# Patient Record
Sex: MALE | Race: WHITE | NOT HISPANIC OR LATINO | Employment: OTHER | URBAN - METROPOLITAN AREA
[De-identification: names, ages, dates, MRNs, and addresses within clinical notes are randomized per-mention and may not be internally consistent; named-entity substitution may affect disease eponyms.]

---

## 2020-06-03 ENCOUNTER — NEW PATIENT COMPREHENSIVE (OUTPATIENT)
Dept: URBAN - METROPOLITAN AREA CLINIC 47 | Facility: CLINIC | Age: 75
End: 2020-06-03

## 2020-06-03 DIAGNOSIS — H25.11: ICD-10-CM

## 2020-06-03 DIAGNOSIS — H25.12: ICD-10-CM

## 2020-06-03 DIAGNOSIS — H52.7: ICD-10-CM

## 2020-06-03 PROCEDURE — 92004 COMPRE OPH EXAM NEW PT 1/>: CPT

## 2020-06-03 PROCEDURE — 92015 DETERMINE REFRACTIVE STATE: CPT

## 2020-06-03 ASSESSMENT — VISUAL ACUITY
OD_CC: J2
OS_CC: 20/50-1
OD_BAT: >20/400
OS_CC: J2
OD_SC: 20/200
OS_BAT: 20/400
OD_SC: J4
OS_SC: 20/200
OS_SC: J2
OD_CC: 20/60-1
OD_PH: 20/40

## 2020-06-03 ASSESSMENT — TONOMETRY
OS_IOP_MMHG: 16
OD_IOP_MMHG: 16

## 2020-08-04 ENCOUNTER — CATARACT CONSULT (OUTPATIENT)
Dept: URBAN - METROPOLITAN AREA CLINIC 43 | Facility: CLINIC | Age: 75
End: 2020-08-04

## 2020-08-04 DIAGNOSIS — H18.51: ICD-10-CM

## 2020-08-04 DIAGNOSIS — H25.812: ICD-10-CM

## 2020-08-04 DIAGNOSIS — H25.811: ICD-10-CM

## 2020-08-04 DIAGNOSIS — H35.363: ICD-10-CM

## 2020-08-04 DIAGNOSIS — H04.123: ICD-10-CM

## 2020-08-04 PROCEDURE — V2799PMN IMPRIMIS PRED-MOXI-NEPAF 5ML

## 2020-08-04 PROCEDURE — 92134 CPTRZ OPH DX IMG PST SGM RTA: CPT

## 2020-08-04 PROCEDURE — 92136TC INTERFEROMETRY - TECHNICAL COMPONENT

## 2020-08-04 PROCEDURE — 99214 OFFICE O/P EST MOD 30 MIN: CPT

## 2020-08-04 PROCEDURE — 92286 ANT SGM IMG I&R SPECLR MIC: CPT

## 2020-08-04 PROCEDURE — 92025-1 CORNEAL TOPOGRAPHY, INS

## 2020-08-04 ASSESSMENT — VISUAL ACUITY
OS_CC: 20/60
OS_CC: J3
OD_CC: 20/200
OS_AM: 20/20
OS_BAT: 20/400
OD_BAT: <20/400
OS_SC: 20/400
OD_SC: 20/100
OD_CC: J3
OD_RAM: 20/20

## 2020-08-04 ASSESSMENT — TONOMETRY
OS_IOP_MMHG: 15
OD_IOP_MMHG: 14

## 2020-08-12 ENCOUNTER — PRE-OP/H&P (OUTPATIENT)
Dept: URBAN - METROPOLITAN AREA CLINIC 39 | Facility: CLINIC | Age: 75
End: 2020-08-12

## 2020-08-12 ENCOUNTER — SURGERY/PROCEDURE (OUTPATIENT)
Dept: URBAN - METROPOLITAN AREA CLINIC 43 | Facility: CLINIC | Age: 75
End: 2020-08-12

## 2020-08-12 DIAGNOSIS — H25.811: ICD-10-CM

## 2020-08-12 PROCEDURE — 99211T TECH SERVICE

## 2020-08-12 PROCEDURE — 66984 XCAPSL CTRC RMVL W/O ECP: CPT

## 2020-08-13 ENCOUNTER — POST OP/EVAL OF SECOND EYE (OUTPATIENT)
Dept: URBAN - METROPOLITAN AREA CLINIC 47 | Facility: CLINIC | Age: 75
End: 2020-08-13

## 2020-08-13 DIAGNOSIS — H25.811: ICD-10-CM

## 2020-08-13 DIAGNOSIS — Z96.1: ICD-10-CM

## 2020-08-13 PROCEDURE — 99024 POSTOP FOLLOW-UP VISIT: CPT

## 2020-08-13 PROCEDURE — 92012 INTRM OPH EXAM EST PATIENT: CPT

## 2020-08-13 ASSESSMENT — VISUAL ACUITY
OS_CC: 20/60
OS_CC: J3
OD_SC: 20/60-1
OS_SC: 20/400
OS_AM: 20/20
OS_BAT: 20/400

## 2020-08-13 ASSESSMENT — TONOMETRY
OS_IOP_MMHG: 15
OD_IOP_MMHG: 15

## 2020-08-19 ENCOUNTER — PRE-OP/H&P (OUTPATIENT)
Dept: URBAN - METROPOLITAN AREA CLINIC 39 | Facility: CLINIC | Age: 75
End: 2020-08-19

## 2020-08-19 DIAGNOSIS — H04.123: ICD-10-CM

## 2020-08-19 DIAGNOSIS — H25.812: ICD-10-CM

## 2020-08-19 DIAGNOSIS — Z96.1: ICD-10-CM

## 2020-08-19 DIAGNOSIS — H35.363: ICD-10-CM

## 2020-08-19 DIAGNOSIS — H18.51: ICD-10-CM

## 2020-08-19 DIAGNOSIS — H25.811: ICD-10-CM

## 2020-08-19 PROCEDURE — 99211T TECH SERVICE

## 2020-08-19 ASSESSMENT — VISUAL ACUITY
OS_SC: 20/400
OD_SC: J4
OS_SC: J2
OD_SC: 20/100

## 2020-08-19 ASSESSMENT — TONOMETRY
OD_IOP_MMHG: 14
OS_IOP_MMHG: 16

## 2020-08-26 ENCOUNTER — PRE-OP/H&P (OUTPATIENT)
Dept: URBAN - METROPOLITAN AREA CLINIC 39 | Facility: CLINIC | Age: 75
End: 2020-08-26

## 2020-08-26 ENCOUNTER — SURGERY/PROCEDURE (OUTPATIENT)
Dept: URBAN - METROPOLITAN AREA CLINIC 43 | Facility: CLINIC | Age: 75
End: 2020-08-26

## 2020-08-26 DIAGNOSIS — Z96.1: ICD-10-CM

## 2020-08-26 DIAGNOSIS — H18.51: ICD-10-CM

## 2020-08-26 DIAGNOSIS — H25.812: ICD-10-CM

## 2020-08-26 DIAGNOSIS — H35.363: ICD-10-CM

## 2020-08-26 DIAGNOSIS — H25.811: ICD-10-CM

## 2020-08-26 DIAGNOSIS — H04.123: ICD-10-CM

## 2020-08-26 PROCEDURE — 66984 XCAPSL CTRC RMVL W/O ECP: CPT

## 2020-08-26 PROCEDURE — 99211T TECH SERVICE

## 2020-08-27 ENCOUNTER — 1 DAY POST-OP (OUTPATIENT)
Dept: URBAN - METROPOLITAN AREA CLINIC 47 | Facility: CLINIC | Age: 75
End: 2020-08-27

## 2020-08-27 DIAGNOSIS — Z96.1: ICD-10-CM

## 2020-08-27 PROCEDURE — 99024 POSTOP FOLLOW-UP VISIT: CPT

## 2020-08-27 ASSESSMENT — TONOMETRY
OD_IOP_MMHG: 14
OS_IOP_MMHG: 15

## 2020-08-27 ASSESSMENT — VISUAL ACUITY
OS_SC: J4
OS_SC: 20/60
OD_SC: 20/100+2
OD_SC: J1

## 2020-09-03 ENCOUNTER — POST-OP CATARACT (OUTPATIENT)
Dept: URBAN - METROPOLITAN AREA CLINIC 47 | Facility: CLINIC | Age: 75
End: 2020-09-03

## 2020-09-03 DIAGNOSIS — Z96.1: ICD-10-CM

## 2020-09-03 PROCEDURE — 99024 POSTOP FOLLOW-UP VISIT: CPT

## 2020-09-03 ASSESSMENT — VISUAL ACUITY
OS_SC: 20/50
OU_SC: 20/40
OD_SC: 20/40

## 2020-09-03 ASSESSMENT — TONOMETRY
OD_IOP_MMHG: 15
OS_IOP_MMHG: 16

## 2020-09-24 ENCOUNTER — POST-OP CATARACT (OUTPATIENT)
Dept: URBAN - METROPOLITAN AREA CLINIC 47 | Facility: CLINIC | Age: 75
End: 2020-09-24

## 2020-09-24 DIAGNOSIS — H26.493: ICD-10-CM

## 2020-09-24 DIAGNOSIS — Z96.1: ICD-10-CM

## 2020-09-24 PROCEDURE — 99024 POSTOP FOLLOW-UP VISIT: CPT

## 2020-09-24 ASSESSMENT — TONOMETRY
OD_IOP_MMHG: 12
OS_IOP_MMHG: 13

## 2021-03-29 ENCOUNTER — ESTABLISHED COMPREHENSIVE EXAM (OUTPATIENT)
Dept: URBAN - METROPOLITAN AREA CLINIC 47 | Facility: CLINIC | Age: 76
End: 2021-03-29

## 2021-03-29 DIAGNOSIS — H25.811: ICD-10-CM

## 2021-03-29 DIAGNOSIS — H26.492: ICD-10-CM

## 2021-03-29 DIAGNOSIS — H35.363: ICD-10-CM

## 2021-03-29 DIAGNOSIS — Z96.1: ICD-10-CM

## 2021-03-29 DIAGNOSIS — H04.123: ICD-10-CM

## 2021-03-29 DIAGNOSIS — H52.7: ICD-10-CM

## 2021-03-29 PROCEDURE — 92014 COMPRE OPH EXAM EST PT 1/>: CPT

## 2021-03-29 PROCEDURE — 92015 DETERMINE REFRACTIVE STATE: CPT

## 2021-03-29 ASSESSMENT — VISUAL ACUITY
OS_SC: J3
OS_CC: 20/20-2
OD_SC: J1
OS_BAT: 20/100
OS_SC: 20/80-1
OD_CC: 20/30+2
OD_SC: 20/200
OD_BAT: 20/400

## 2021-03-29 ASSESSMENT — TONOMETRY
OS_IOP_MMHG: 14
OD_IOP_MMHG: 14

## 2021-04-28 ENCOUNTER — FOLLOW UP (OUTPATIENT)
Dept: URBAN - METROPOLITAN AREA CLINIC 43 | Facility: CLINIC | Age: 76
End: 2021-04-28

## 2021-04-28 DIAGNOSIS — H40.023: ICD-10-CM

## 2021-04-28 DIAGNOSIS — H25.811: ICD-10-CM

## 2021-04-28 PROCEDURE — 92133 CPTRZD OPH DX IMG PST SGM ON: CPT

## 2021-04-28 PROCEDURE — 92012 INTRM OPH EXAM EST PATIENT: CPT

## 2021-04-28 PROCEDURE — 76514 ECHO EXAM OF EYE THICKNESS: CPT

## 2021-04-28 ASSESSMENT — PACHYMETRY
OS_CT_UM: 607
OD_CT_UM: 591

## 2021-04-28 ASSESSMENT — TONOMETRY
OS_IOP_MMHG: 13
OD_IOP_MMHG: 13

## 2021-04-28 ASSESSMENT — VISUAL ACUITY
OD_CC: 20/20
OS_CC: 20/20

## 2021-10-07 NOTE — PATIENT DISCUSSION
10/7/21: GIVEN THAT SX HAD BEEN PRESENT FOR SEVERAL MONTHS, IT IS LIKELY PVD OCCURRED SEVERAL MONTHS AGO. DISCUSSED THE RISK IS ABOUT ~5% FOR A TEAR. DISCUSSED SIGNS OF CONCERN, IF ANY CONCERN SHE WOULD CALL ASAP. PICTURES WERE REVIEWED AND ALL QUESTIONS WERE ANSWERED. SHE CAN MONITOR EARLY MACULAR CHANGES W GENERAL OPHTHALMOLOGIST OR WITH US IN 1 YR.

## 2021-10-07 NOTE — PATIENT DISCUSSION
10/7/21: The patient has developed an ACUTE PVD. No holes or tears were seen on clinical exam today. Reviewed the signs and symptoms of retinal tear/retinal detachment and the importance of calling for prompt evaluation should there be increasing floaters, new flashing lights, or decreasing peripheral vision in either eye at any time. Observation recommended.

## 2021-11-08 ENCOUNTER — VISUAL FIELD FOLLOW-UP (OUTPATIENT)
Dept: URBAN - METROPOLITAN AREA CLINIC 47 | Facility: CLINIC | Age: 76
End: 2021-11-08

## 2021-11-08 DIAGNOSIS — H25.811: ICD-10-CM

## 2021-11-08 DIAGNOSIS — H40.023: ICD-10-CM

## 2021-11-08 DIAGNOSIS — H26.492: ICD-10-CM

## 2021-11-08 DIAGNOSIS — H47.20: ICD-10-CM

## 2021-11-08 DIAGNOSIS — H04.123: ICD-10-CM

## 2021-11-08 PROCEDURE — 92250 FUNDUS PHOTOGRAPHY W/I&R: CPT

## 2021-11-08 PROCEDURE — 92012 INTRM OPH EXAM EST PATIENT: CPT

## 2021-11-08 PROCEDURE — 92083 EXTENDED VISUAL FIELD XM: CPT

## 2021-11-08 ASSESSMENT — VISUAL ACUITY
OS_CC: 20/25+2
OD_CC: 20/20

## 2021-11-08 ASSESSMENT — TONOMETRY
OS_IOP_MMHG: 13
OD_IOP_MMHG: 15

## 2021-11-29 ENCOUNTER — VISUAL FIELD FOLLOW-UP (OUTPATIENT)
Dept: URBAN - METROPOLITAN AREA CLINIC 47 | Facility: CLINIC | Age: 76
End: 2021-11-29

## 2021-11-29 DIAGNOSIS — H40.023: ICD-10-CM

## 2021-11-29 DIAGNOSIS — H47.20: ICD-10-CM

## 2021-11-29 DIAGNOSIS — H25.811: ICD-10-CM

## 2021-11-29 PROCEDURE — 92012 INTRM OPH EXAM EST PATIENT: CPT

## 2021-11-29 PROCEDURE — 92083 EXTENDED VISUAL FIELD XM: CPT

## 2021-11-29 ASSESSMENT — VISUAL ACUITY
OS_CC: 20/25
OD_CC: 20/20
OU_CC: 20/20

## 2021-11-29 ASSESSMENT — TONOMETRY
OS_IOP_MMHG: 13
OD_IOP_MMHG: 13

## 2021-12-28 ENCOUNTER — FOLLOW UP (OUTPATIENT)
Dept: URBAN - METROPOLITAN AREA CLINIC 47 | Facility: CLINIC | Age: 76
End: 2021-12-28

## 2021-12-28 DIAGNOSIS — H40.1111: ICD-10-CM

## 2021-12-28 DIAGNOSIS — H40.1123: ICD-10-CM

## 2021-12-28 DIAGNOSIS — H25.811: ICD-10-CM

## 2021-12-28 PROCEDURE — 92012 INTRM OPH EXAM EST PATIENT: CPT

## 2021-12-28 RX ORDER — TIMOLOL MALEATE 6.8 MG/ML: 1 SOLUTION OPHTHALMIC TWICE A DAY

## 2021-12-28 ASSESSMENT — VISUAL ACUITY
OD_CC: 20/25
OS_CC: 20/30

## 2021-12-28 ASSESSMENT — TONOMETRY
OD_IOP_MMHG: 11
OS_IOP_MMHG: 11

## 2022-03-29 ENCOUNTER — COMPREHENSIVE EXAM (OUTPATIENT)
Dept: URBAN - METROPOLITAN AREA CLINIC 47 | Facility: CLINIC | Age: 77
End: 2022-03-29

## 2022-03-29 DIAGNOSIS — Z96.1: ICD-10-CM

## 2022-03-29 DIAGNOSIS — H40.1123: ICD-10-CM

## 2022-03-29 DIAGNOSIS — H26.492: ICD-10-CM

## 2022-03-29 DIAGNOSIS — H25.811: ICD-10-CM

## 2022-03-29 DIAGNOSIS — H47.20: ICD-10-CM

## 2022-03-29 DIAGNOSIS — H52.7: ICD-10-CM

## 2022-03-29 DIAGNOSIS — H04.123: ICD-10-CM

## 2022-03-29 DIAGNOSIS — H40.1111: ICD-10-CM

## 2022-03-29 DIAGNOSIS — H35.3131: ICD-10-CM

## 2022-03-29 PROCEDURE — 92015 DETERMINE REFRACTIVE STATE: CPT

## 2022-03-29 PROCEDURE — 92014 COMPRE OPH EXAM EST PT 1/>: CPT

## 2022-03-29 ASSESSMENT — VISUAL ACUITY
OU_SC: J2
OD_CC: 20/20
OU_CC: 20/20
OU_SC: 20/20
OD_SC: J4
OS_SC: 20/25-1
OS_CC: 20/25-2
OS_SC: J3
OD_SC: 20/50

## 2022-03-29 ASSESSMENT — TONOMETRY
OS_IOP_MMHG: 13
OD_IOP_MMHG: 12

## 2022-09-26 ENCOUNTER — FOLLOW UP (OUTPATIENT)
Dept: URBAN - METROPOLITAN AREA CLINIC 47 | Facility: CLINIC | Age: 77
End: 2022-09-26

## 2022-09-26 DIAGNOSIS — H40.1111: ICD-10-CM

## 2022-09-26 DIAGNOSIS — H26.492: ICD-10-CM

## 2022-09-26 DIAGNOSIS — H47.20: ICD-10-CM

## 2022-09-26 DIAGNOSIS — H40.1123: ICD-10-CM

## 2022-09-26 DIAGNOSIS — H04.123: ICD-10-CM

## 2022-09-26 DIAGNOSIS — H25.811: ICD-10-CM

## 2022-09-26 DIAGNOSIS — Z96.1: ICD-10-CM

## 2022-09-26 PROCEDURE — 92133 CPTRZD OPH DX IMG PST SGM ON: CPT

## 2022-09-26 PROCEDURE — 99213 OFFICE O/P EST LOW 20 MIN: CPT

## 2022-09-26 ASSESSMENT — VISUAL ACUITY
OS_SC: 20/30-1
OD_SC: 20/40

## 2022-09-26 ASSESSMENT — TONOMETRY
OS_IOP_MMHG: 11
OD_IOP_MMHG: 11

## 2022-12-19 ENCOUNTER — FOLLOW UP (OUTPATIENT)
Dept: URBAN - METROPOLITAN AREA CLINIC 47 | Facility: CLINIC | Age: 77
End: 2022-12-19

## 2022-12-19 PROCEDURE — 92250 FUNDUS PHOTOGRAPHY W/I&R: CPT

## 2022-12-19 PROCEDURE — 92083 EXTENDED VISUAL FIELD XM: CPT

## 2022-12-19 PROCEDURE — 99213 OFFICE O/P EST LOW 20 MIN: CPT

## 2022-12-19 RX ORDER — BRIMONIDINE TARTRATE, TIMOLOL MALEATE 2; 5 MG/ML; MG/ML: 1 SOLUTION/ DROPS OPHTHALMIC TWICE A DAY

## 2022-12-19 ASSESSMENT — VISUAL ACUITY
OU_CC: 20/20
OD_CC: 20/20
OS_CC: 20/20-1

## 2022-12-19 ASSESSMENT — TONOMETRY
OS_IOP_MMHG: 10
OD_IOP_MMHG: 11

## 2023-01-30 ENCOUNTER — FOLLOW UP (OUTPATIENT)
Dept: URBAN - METROPOLITAN AREA CLINIC 47 | Facility: CLINIC | Age: 78
End: 2023-01-30

## 2023-01-30 DIAGNOSIS — H40.1111: ICD-10-CM

## 2023-01-30 DIAGNOSIS — H40.1123: ICD-10-CM

## 2023-01-30 PROCEDURE — 99213 OFFICE O/P EST LOW 20 MIN: CPT

## 2023-01-30 RX ORDER — DORZOLAMIDE HYDROCHLORIDE TIMOLOL MALEATE 20; 5 MG/ML; MG/ML: 1 SOLUTION/ DROPS OPHTHALMIC TWICE A DAY

## 2023-01-30 ASSESSMENT — VISUAL ACUITY
OS_CC: 20/25+2
OD_CC: 20/20

## 2023-01-30 ASSESSMENT — TONOMETRY
OS_IOP_MMHG: 06
OD_IOP_MMHG: 06

## 2023-05-01 ENCOUNTER — COMPREHENSIVE EXAM (OUTPATIENT)
Dept: URBAN - METROPOLITAN AREA CLINIC 47 | Facility: CLINIC | Age: 78
End: 2023-05-01

## 2023-05-01 DIAGNOSIS — H35.3131: ICD-10-CM

## 2023-05-01 DIAGNOSIS — H52.7: ICD-10-CM

## 2023-05-01 DIAGNOSIS — H04.123: ICD-10-CM

## 2023-05-01 DIAGNOSIS — H40.1123: ICD-10-CM

## 2023-05-01 DIAGNOSIS — H26.492: ICD-10-CM

## 2023-05-01 DIAGNOSIS — H47.20: ICD-10-CM

## 2023-05-01 DIAGNOSIS — Z96.1: ICD-10-CM

## 2023-05-01 DIAGNOSIS — H40.1111: ICD-10-CM

## 2023-05-01 PROCEDURE — 92014 COMPRE OPH EXAM EST PT 1/>: CPT

## 2023-05-01 PROCEDURE — 92015 DETERMINE REFRACTIVE STATE: CPT

## 2023-05-01 ASSESSMENT — VISUAL ACUITY
OU_SC: J1
OD_SC: J1
OU_SC: 20/30+2
OD_SC: 20/50-1
OS_SC: J2
OU_CC: 20/20
OS_CC: 20/25+2
OD_CC: 20/20
OS_SC: 20/40

## 2023-05-01 ASSESSMENT — TONOMETRY
OD_IOP_MMHG: 6
OS_IOP_MMHG: 10

## 2023-11-01 ENCOUNTER — FOLLOW UP (OUTPATIENT)
Dept: URBAN - METROPOLITAN AREA CLINIC 47 | Facility: CLINIC | Age: 78
End: 2023-11-01

## 2023-11-01 DIAGNOSIS — H40.1123: ICD-10-CM

## 2023-11-01 DIAGNOSIS — H40.1111: ICD-10-CM

## 2023-11-01 PROCEDURE — 99213 OFFICE O/P EST LOW 20 MIN: CPT

## 2023-11-01 PROCEDURE — 92133 CPTRZD OPH DX IMG PST SGM ON: CPT

## 2023-11-01 ASSESSMENT — TONOMETRY
OS_IOP_MMHG: 8
OD_IOP_MMHG: 10

## 2023-11-01 ASSESSMENT — VISUAL ACUITY
OD_CC: 20/20-2
OS_CC: 20/25

## 2024-02-28 ENCOUNTER — FOLLOW UP (OUTPATIENT)
Dept: URBAN - METROPOLITAN AREA CLINIC 47 | Facility: CLINIC | Age: 79
End: 2024-02-28

## 2024-02-28 DIAGNOSIS — H40.1123: ICD-10-CM

## 2024-02-28 DIAGNOSIS — H26.493: ICD-10-CM

## 2024-02-28 DIAGNOSIS — H04.123: ICD-10-CM

## 2024-02-28 DIAGNOSIS — Z96.1: ICD-10-CM

## 2024-02-28 DIAGNOSIS — H35.3131: ICD-10-CM

## 2024-02-28 DIAGNOSIS — H40.1111: ICD-10-CM

## 2024-02-28 PROCEDURE — 99214 OFFICE O/P EST MOD 30 MIN: CPT

## 2024-02-28 ASSESSMENT — VISUAL ACUITY
OU_CC: 20/20
OD_CC: 20/25-1
OS_CC: 20/25-2

## 2024-02-28 ASSESSMENT — TONOMETRY
OD_IOP_MMHG: 9
OS_IOP_MMHG: 10

## 2024-06-06 ENCOUNTER — FOLLOW UP (OUTPATIENT)
Dept: URBAN - METROPOLITAN AREA CLINIC 47 | Facility: CLINIC | Age: 79
End: 2024-06-06

## 2024-06-06 DIAGNOSIS — H40.1123: ICD-10-CM

## 2024-06-06 DIAGNOSIS — H40.1111: ICD-10-CM

## 2024-06-06 PROCEDURE — 92250 FUNDUS PHOTOGRAPHY W/I&R: CPT

## 2024-06-06 PROCEDURE — 99213 OFFICE O/P EST LOW 20 MIN: CPT

## 2024-06-06 PROCEDURE — 92083 EXTENDED VISUAL FIELD XM: CPT

## 2024-06-06 RX ORDER — LATANOPROST 50 UG/ML: 1 SOLUTION/ DROPS OPHTHALMIC EVERY EVENING

## 2024-06-06 ASSESSMENT — VISUAL ACUITY
OD_SC: 20/25-1
OS_SC: 20/25-1
OU_SC: 20/20

## 2024-06-06 ASSESSMENT — TONOMETRY
OD_IOP_MMHG: 12
OS_IOP_MMHG: 12

## 2024-08-19 ENCOUNTER — FOLLOW UP (OUTPATIENT)
Dept: URBAN - METROPOLITAN AREA CLINIC 47 | Facility: CLINIC | Age: 79
End: 2024-08-19

## 2024-08-19 DIAGNOSIS — H40.1123: ICD-10-CM

## 2024-08-19 DIAGNOSIS — H40.1111: ICD-10-CM

## 2024-08-19 PROCEDURE — 92083 EXTENDED VISUAL FIELD XM: CPT

## 2024-08-19 PROCEDURE — 99213 OFFICE O/P EST LOW 20 MIN: CPT

## 2024-08-19 ASSESSMENT — TONOMETRY
OD_IOP_MMHG: 06
OS_IOP_MMHG: 08

## 2024-08-19 ASSESSMENT — VISUAL ACUITY
OS_CC: 20/30+2
OD_CC: 20/20

## 2024-12-09 ENCOUNTER — COMPREHENSIVE EXAM (OUTPATIENT)
Age: 79
End: 2024-12-09

## 2024-12-09 DIAGNOSIS — H35.3131: ICD-10-CM

## 2024-12-09 DIAGNOSIS — H40.1123: ICD-10-CM

## 2024-12-09 DIAGNOSIS — H26.493: ICD-10-CM

## 2024-12-09 DIAGNOSIS — Z96.1: ICD-10-CM

## 2024-12-09 DIAGNOSIS — H40.1111: ICD-10-CM

## 2024-12-09 DIAGNOSIS — H04.123: ICD-10-CM

## 2024-12-09 PROCEDURE — 92015 DETERMINE REFRACTIVE STATE: CPT

## 2024-12-09 PROCEDURE — 99214 OFFICE O/P EST MOD 30 MIN: CPT

## 2024-12-09 PROCEDURE — 99199MRDT MACULAR RISK (DNA TESTING)

## 2025-01-07 ENCOUNTER — ADDENDUM (OUTPATIENT)
Age: 80
End: 2025-01-07

## 2025-03-28 ENCOUNTER — FOLLOW UP (OUTPATIENT)
Age: 80
End: 2025-03-28

## 2025-03-28 DIAGNOSIS — H40.1123: ICD-10-CM

## 2025-03-28 DIAGNOSIS — H40.1111: ICD-10-CM

## 2025-03-28 PROCEDURE — 92133 CPTRZD OPH DX IMG PST SGM ON: CPT

## 2025-03-28 PROCEDURE — 99213 OFFICE O/P EST LOW 20 MIN: CPT

## 2025-08-11 ENCOUNTER — FOLLOW UP (OUTPATIENT)
Age: 80
End: 2025-08-11

## 2025-08-11 DIAGNOSIS — H35.363: ICD-10-CM

## 2025-08-11 DIAGNOSIS — H40.1123: ICD-10-CM

## 2025-08-11 DIAGNOSIS — H40.1111: ICD-10-CM

## 2025-08-11 DIAGNOSIS — H25.811: ICD-10-CM

## 2025-08-11 DIAGNOSIS — H47.20: ICD-10-CM

## 2025-08-11 PROCEDURE — 99213 OFFICE O/P EST LOW 20 MIN: CPT

## 2025-08-14 ENCOUNTER — TECH ONLY (OUTPATIENT)
Age: 80
End: 2025-08-14

## 2025-08-14 DIAGNOSIS — H40.1111: ICD-10-CM

## 2025-08-14 DIAGNOSIS — H47.20: ICD-10-CM

## 2025-08-14 DIAGNOSIS — H26.493: ICD-10-CM

## 2025-08-14 DIAGNOSIS — H40.1123: ICD-10-CM

## 2025-08-14 DIAGNOSIS — H35.363: ICD-10-CM

## 2025-08-14 PROCEDURE — 92083 EXTENDED VISUAL FIELD XM: CPT

## 2025-08-14 PROCEDURE — 99211T TECH SERVICE
